# Patient Record
Sex: FEMALE
[De-identification: names, ages, dates, MRNs, and addresses within clinical notes are randomized per-mention and may not be internally consistent; named-entity substitution may affect disease eponyms.]

---

## 2018-01-24 ENCOUNTER — HOSPITAL ENCOUNTER (EMERGENCY)
Dept: HOSPITAL 5 - ED | Age: 33
LOS: 1 days | Discharge: HOME | End: 2018-01-25
Payer: SELF-PAY

## 2018-01-24 DIAGNOSIS — Z98.51: ICD-10-CM

## 2018-01-24 DIAGNOSIS — J11.1: Primary | ICD-10-CM

## 2018-01-24 PROCEDURE — 71046 X-RAY EXAM CHEST 2 VIEWS: CPT

## 2018-01-24 PROCEDURE — 87400 INFLUENZA A/B EACH AG IA: CPT

## 2018-01-24 NOTE — XRAY REPORT
FINAL REPORT



EXAM:  XR CHEST ROUTINE 2V



HISTORY:  cough 



TECHNIQUE:  Two view chest PA and lateral 



PRIORS:  None.



FINDINGS:  

Cardiac and mediastinal contours are unremarkable.  No focal

pulmonary infiltrate is identified.  No pleural fluid collection

seen. Pulmonary vasculature is unremarkable. 



IMPRESSION:  

Negative two-view chest

## 2018-01-25 NOTE — EMERGENCY DEPARTMENT REPORT
- General


Chief Complaint: Upper Respiratory Infection


Stated Complaint: FLU


Time Seen by Provider: 18 22:30


Source: patient


Mode of arrival: Ambulatory


Limitations: No Limitations





- History of Present Illness


Initial Comments: 





This is a 32-year-old female nontoxic, well nourished in appearance, no acute 

signs of distress presents to the ED with c/o of nonproductive cough, body aches

,  rhinorrhea, sore throat, and nasal congestion 1 day.  Patient stated her 

coworkers who she is in close contact with is diagnosed with flu.  Patient 

denies any recent travels, long car rides, recent hospital stays.  Patient 

denies any calf pain or calf tenderness.  Denies any hemoptysis.  Patient 

denies chest pain, shortness of breath, fever, chills, nausea, vomiting, 

headache, stiff neck, numbness, tingling.  Patient denies any allergies or PMH. 


MD Complaint: cough, sore throat, rhinorrhea, nasal congestion


-: days(s) (1)


Severity: mild


Severity scale (0 -10): 8


Quality: aching


Consistency: constant


Improves With: nothing


Worsens With: nothing


Associated Symptoms: rhinorrhea, nasal congestion, sore throat, cough.  denies: 

fever, chills, myalgias, diaphoresis, headache, stiff neck, chest pain, 

shortness of breath, abdominal pain, nausea, vomiting, diarrhea, dysuria, rash, 

confusion, right sweats, weight loss, epistaxis, hoarseness, ear pain


Treatments Prior to Arrival: none





- Related Data


 Previous Rx's











 Medication  Instructions  Recorded  Last Taken  Type


 


Benzonatate [Tessalon Perle] 100 mg PO Q6H PRN #20 capsule 18 Unknown Rx


 


Oseltamivir [Tamiflu] 75 mg PO BID #14 cap 18 Unknown Rx











 Allergies











Allergy/AdvReac Type Severity Reaction Status Date / Time


 


No Known Allergies Allergy   Unverified 18 18:42














ED Review of Systems


ROS: 


Stated complaint: FLU


Other details as noted in HPI





Constitutional: denies: chills, fever


Eyes: denies: eye pain, eye discharge, vision change


ENT: throat pain.  denies: ear pain


Respiratory: cough.  denies: shortness of breath, wheezing


Cardiovascular: denies: chest pain, palpitations


Endocrine: no symptoms reported


Gastrointestinal: denies: abdominal pain, nausea, diarrhea


Genitourinary: denies: urgency, dysuria, discharge


Musculoskeletal: denies: back pain, joint swelling, arthralgia


Skin: denies: rash, lesions


Neurological: denies: headache, weakness, paresthesias


Psychiatric: denies: anxiety, depression


Hematological/Lymphatic: denies: easy bleeding, easy bruising





ED Past Medical Hx





- Past Medical History


Previous Medical History?: Yes


Additional medical history: Flu likje s/s





- Surgical History


Past Surgical History?: Yes


Additional Surgical History:  x 3, Tubaligation, [place]





- Social History


Smoking Status: Never Smoker


Substance Use Type: Non Opiate Pain, Other





- Medications


Home Medications: 


 Home Medications











 Medication  Instructions  Recorded  Confirmed  Last Taken  Type


 


Benzonatate [Tessalon Perle] 100 mg PO Q6H PRN #20 capsule 18  Unknown Rx


 


Oseltamivir [Tamiflu] 75 mg PO BID #14 cap 18  Unknown Rx














ED Physical Exam





- General


Limitations: No Limitations


General appearance: alert, in no apparent distress





- Head


Head exam: Present: atraumatic, normocephalic





- Eye


Eye exam: Present: normal appearance, PERRL, EOMI


Pupils: Present: normal accommodation





- ENT


ENT exam: Present: mucous membranes moist, TM's normal bilaterally, normal 

external ear exam





- Expanded ENT Exam


  ** Expanded


Ear exam: Present: normal external inspection


Mouth exam: Present: normal external inspection, tongue normal.  Absent: 

drooling, trismus, muffled voice, tongue elevation, laceration


Teeth exam: Present: normal inspection


Throat exam: Positive: tonsillar erythema, other (Uvula midline. No abscess or 

swelling noted.).  Negative: tonsillomegaly, tonsillar exudate, R peritonsillar 

mass, L peritonsillar mass





- Neck


Neck exam: Present: normal inspection, full ROM.  Absent: tenderness, 

meningismus, lymphadenopathy, thyromegaly





- Respiratory


Respiratory exam: Present: normal lung sounds bilaterally.  Absent: respiratory 

distress, wheezes, rales, rhonchi, stridor, chest wall tenderness, accessory 

muscle use, decreased breath sounds, prolonged expiratory





- Cardiovascular


Cardiovascular Exam: Present: regular rate, normal rhythm, normal heart sounds.

  Absent: bradycardia, tachycardia, irregular rhythm, systolic murmur, 

diastolic murmur, rubs, gallop





- GI/Abdominal


GI/Abdominal exam: Present: soft, normal bowel sounds.  Absent: distended, 

tenderness, guarding, rebound, rigid, diminished bowel sounds





- Rectal


Rectal exam: Present: deferred





- Extremities Exam


Extremities exam: Present: normal inspection, full ROM, normal capillary 

refill.  Absent: tenderness, pedal edema, joint swelling, calf tenderness





- Back Exam


Back exam: Present: normal inspection, full ROM.  Absent: tenderness, CVA 

tenderness (R), CVA tenderness (L), muscle spasm, paraspinal tenderness, 

vertebral tenderness, rash noted





- Neurological Exam


Neurological exam: Present: alert, oriented X3, CN II-XII intact, normal gait, 

reflexes normal





- Psychiatric


Psychiatric exam: Present: normal affect, normal mood





- Skin


Skin exam: Present: warm, dry, intact, normal color.  Absent: rash





ED Course





 Vital Signs











  18





  18:39


 


Temperature 97.8 F


 


Pulse Rate 75


 


Respiratory 18





Rate 


 


Blood Pressure 156/98


 


O2 Sat by Pulse 100





Oximetry 














- Reevaluation(s)


Reevaluation #1: 





18 00:23


Patient is speaking in full sentences with no signs of distress noted.





ED Medical Decision Making





- Medical Decision Making





This is a 32-year-old female that presents with possible influenza.  Patient is 

stable and was examined by me.  Chest x-ray has been obtained and dictated by 

radiologist within normal limits.  Patient notified of x-ray results with no 

plates noted by the patient.  Influenza swab negative.  Patient received Motrin 

and Tessalon Perles in the ED.  Vital signs stable prior to discharge.  Patient 

is afebrile.  Normal heart rate.  I'll treat patient empirically with tamiflu 

at discharge due to patient having flu like symptoms and being in cotnact with 

positive flu.  Patient was orally rehydrated in the ER and patient tolerated 

well with no signs of nasuea or vomiting. Patient was instructed Follow-up with 

a primary care doctor in 3-5 days or if symptoms worsen and continue return to 

emergency room as soon as possible.  At time time of discharge, the patient 

does not seem toxic or ill in appearance.  No acute signs of distress noted.  

Patient agrees to discharge treatment plan of care.  No further questions noted 

by the patient.


Critical care attestation.: 


If time is entered above; I have spent that time in minutes in the direct care 

of this critically ill patient, excluding procedure time.








ED Disposition


Clinical Impression: 


 Influenza





Disposition: DC- TO HOME OR SELFCARE


Is pt being admited?: No


Does the pt Need Aspirin: No


Condition: Stable


Instructions:  Influenza (ED), Oseltamivir (By mouth)


Additional Instructions: 


Follow-up with a primary care doctor in 3-5 days or if symptoms worsen and 

continue return to emergency room as soon as possible. 


Increase rest, hydration, and take Motrin/Tylenol over-the-counter if fever 

presents.


Prescriptions: 


Benzonatate [Tessalon Perle] 100 mg PO Q6H PRN #20 capsule


 PRN Reason: Cough


Oseltamivir [Tamiflu] 75 mg PO BID #14 cap


Referrals: 


JOHN PANCHAL MD [Primary Care Provider] - 3-5 Days


PRIMARY CARE,MD [Referring] - 3-5 Days


[facility] [Outside] - 3-5 Days


[facility] [Outside] - 3-5 Days


Forms:  Work/School Release Form(ED)

## 2024-08-31 ENCOUNTER — APPOINTMENT (OUTPATIENT)
Dept: RADIOLOGY | Facility: MEDICAL CENTER | Age: 39
End: 2024-08-31
Attending: STUDENT IN AN ORGANIZED HEALTH CARE EDUCATION/TRAINING PROGRAM

## 2024-08-31 ENCOUNTER — HOSPITAL ENCOUNTER (EMERGENCY)
Facility: MEDICAL CENTER | Age: 39
End: 2024-08-31
Attending: STUDENT IN AN ORGANIZED HEALTH CARE EDUCATION/TRAINING PROGRAM

## 2024-08-31 VITALS
HEART RATE: 89 BPM | DIASTOLIC BLOOD PRESSURE: 105 MMHG | RESPIRATION RATE: 16 BRPM | WEIGHT: 293 LBS | BODY MASS INDEX: 50.02 KG/M2 | TEMPERATURE: 97 F | SYSTOLIC BLOOD PRESSURE: 161 MMHG | HEIGHT: 64 IN | OXYGEN SATURATION: 96 %

## 2024-08-31 DIAGNOSIS — S80.11XA CONTUSION OF MULTIPLE SITES OF RIGHT LOWER EXTREMITY, INITIAL ENCOUNTER: ICD-10-CM

## 2024-08-31 DIAGNOSIS — S80.12XA CONTUSION OF MULTIPLE SITES OF LEFT LOWER EXTREMITY, INITIAL ENCOUNTER: ICD-10-CM
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
## 2024-08-31 PROCEDURE — 73590 X-RAY EXAM OF LOWER LEG: CPT | Mod: RT

## 2024-08-31 PROCEDURE — 73090 X-RAY EXAM OF FOREARM: CPT | Mod: RT

## 2024-08-31 PROCEDURE — 73590 X-RAY EXAM OF LOWER LEG: CPT | Mod: LT

## 2024-08-31 PROCEDURE — 73552 X-RAY EXAM OF FEMUR 2/>: CPT | Mod: LT

## 2024-08-31 PROCEDURE — 99284 EMERGENCY DEPT VISIT MOD MDM: CPT

## 2024-08-31 PROCEDURE — 73552 X-RAY EXAM OF FEMUR 2/>: CPT | Mod: RT
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
## 2025-01-02 ENCOUNTER — NON-PROVIDER VISIT (OUTPATIENT)
Dept: OCCUPATIONAL MEDICINE | Facility: CLINIC | Age: 40
End: 2025-01-02

## 2025-01-02 DIAGNOSIS — Z02.1 PRE-EMPLOYMENT DRUG SCREENING: ICD-10-CM

## 2025-01-02 LAB
AMP AMPHETAMINE: NORMAL
COC COCAINE: NORMAL
INT CON NEG: NEGATIVE
INT CON POS: POSITIVE
MET METHAMPHETAMINES: NORMAL
OPI OPIATES: NORMAL
PCP PHENCYCLIDINE: NORMAL
POC DRUG COMMENT 753798-OCCUPATIONAL HEALTH: NEGATIVE
THC: NORMAL

## 2025-01-02 PROCEDURE — 80305 DRUG TEST PRSMV DIR OPT OBS: CPT | Performed by: PREVENTIVE MEDICINE

## 2025-04-16 ENCOUNTER — HOSPITAL ENCOUNTER (EMERGENCY)
Facility: MEDICAL CENTER | Age: 40
End: 2025-04-16
Attending: EMERGENCY MEDICINE

## 2025-04-16 ENCOUNTER — PHARMACY VISIT (OUTPATIENT)
Dept: PHARMACY | Facility: MEDICAL CENTER | Age: 40
End: 2025-04-16
Payer: COMMERCIAL

## 2025-04-16 ENCOUNTER — APPOINTMENT (OUTPATIENT)
Dept: RADIOLOGY | Facility: MEDICAL CENTER | Age: 40
End: 2025-04-16
Attending: EMERGENCY MEDICINE

## 2025-04-16 VITALS
HEIGHT: 64 IN | DIASTOLIC BLOOD PRESSURE: 81 MMHG | RESPIRATION RATE: 14 BRPM | TEMPERATURE: 97 F | SYSTOLIC BLOOD PRESSURE: 130 MMHG | BODY MASS INDEX: 50.02 KG/M2 | OXYGEN SATURATION: 91 % | WEIGHT: 293 LBS | HEART RATE: 76 BPM

## 2025-04-16 DIAGNOSIS — R05.1 ACUTE COUGH: ICD-10-CM

## 2025-04-16 DIAGNOSIS — H66.001 NON-RECURRENT ACUTE SUPPURATIVE OTITIS MEDIA OF RIGHT EAR WITHOUT SPONTANEOUS RUPTURE OF TYMPANIC MEMBRANE: ICD-10-CM

## 2025-04-16 DIAGNOSIS — J06.9 UPPER RESPIRATORY TRACT INFECTION, UNSPECIFIED TYPE: ICD-10-CM

## 2025-04-16 LAB
ALBUMIN SERPL BCP-MCNC: 3.7 G/DL (ref 3.2–4.9)
ALBUMIN/GLOB SERPL: 1.1 G/DL
ALP SERPL-CCNC: 83 U/L (ref 30–99)
ALT SERPL-CCNC: 26 U/L (ref 2–50)
ANION GAP SERPL CALC-SCNC: 10 MMOL/L (ref 7–16)
AST SERPL-CCNC: 27 U/L (ref 12–45)
BASOPHILS # BLD AUTO: 0.9 % (ref 0–1.8)
BASOPHILS # BLD: 0.08 K/UL (ref 0–0.12)
BILIRUB SERPL-MCNC: 0.4 MG/DL (ref 0.1–1.5)
BUN SERPL-MCNC: 10 MG/DL (ref 8–22)
CALCIUM ALBUM COR SERPL-MCNC: 8.8 MG/DL (ref 8.5–10.5)
CALCIUM SERPL-MCNC: 8.6 MG/DL (ref 8.5–10.5)
CHLORIDE SERPL-SCNC: 106 MMOL/L (ref 96–112)
CO2 SERPL-SCNC: 22 MMOL/L (ref 20–33)
CREAT SERPL-MCNC: 0.72 MG/DL (ref 0.5–1.4)
EKG IMPRESSION: NORMAL
EOSINOPHIL # BLD AUTO: 0.18 K/UL (ref 0–0.51)
EOSINOPHIL NFR BLD: 2 % (ref 0–6.9)
ERYTHROCYTE [DISTWIDTH] IN BLOOD BY AUTOMATED COUNT: 40 FL (ref 35.9–50)
FLUAV RNA SPEC QL NAA+PROBE: NEGATIVE
FLUBV RNA SPEC QL NAA+PROBE: NEGATIVE
GFR SERPLBLD CREATININE-BSD FMLA CKD-EPI: 110 ML/MIN/1.73 M 2
GLOBULIN SER CALC-MCNC: 3.3 G/DL (ref 1.9–3.5)
GLUCOSE SERPL-MCNC: 100 MG/DL (ref 65–99)
HCT VFR BLD AUTO: 43.8 % (ref 37–47)
HGB BLD-MCNC: 14.5 G/DL (ref 12–16)
IMM GRANULOCYTES # BLD AUTO: 0.04 K/UL (ref 0–0.11)
IMM GRANULOCYTES NFR BLD AUTO: 0.4 % (ref 0–0.9)
LYMPHOCYTES # BLD AUTO: 1.53 K/UL (ref 1–4.8)
LYMPHOCYTES NFR BLD: 16.8 % (ref 22–41)
MCH RBC QN AUTO: 30.1 PG (ref 27–33)
MCHC RBC AUTO-ENTMCNC: 33.1 G/DL (ref 32.2–35.5)
MCV RBC AUTO: 91.1 FL (ref 81.4–97.8)
MONOCYTES # BLD AUTO: 0.53 K/UL (ref 0–0.85)
MONOCYTES NFR BLD AUTO: 5.8 % (ref 0–13.4)
NEUTROPHILS # BLD AUTO: 6.74 K/UL (ref 1.82–7.42)
NEUTROPHILS NFR BLD: 74.1 % (ref 44–72)
NRBC # BLD AUTO: 0.02 K/UL
NRBC BLD-RTO: 0.2 /100 WBC (ref 0–0.2)
NT-PROBNP SERPL IA-MCNC: 54 PG/ML (ref 0–125)
PLATELET # BLD AUTO: 311 K/UL (ref 164–446)
PMV BLD AUTO: 10.2 FL (ref 9–12.9)
POTASSIUM SERPL-SCNC: 4 MMOL/L (ref 3.6–5.5)
PROT SERPL-MCNC: 7 G/DL (ref 6–8.2)
RBC # BLD AUTO: 4.81 M/UL (ref 4.2–5.4)
RSV RNA SPEC QL NAA+PROBE: NEGATIVE
SARS-COV-2 RNA RESP QL NAA+PROBE: NOTDETECTED
SODIUM SERPL-SCNC: 138 MMOL/L (ref 135–145)
TROPONIN T SERPL-MCNC: <6 NG/L (ref 6–19)
WBC # BLD AUTO: 9.1 K/UL (ref 4.8–10.8)

## 2025-04-16 PROCEDURE — 94640 AIRWAY INHALATION TREATMENT: CPT

## 2025-04-16 PROCEDURE — RXMED WILLOW AMBULATORY MEDICATION CHARGE: Performed by: EMERGENCY MEDICINE

## 2025-04-16 PROCEDURE — 93005 ELECTROCARDIOGRAM TRACING: CPT | Mod: TC | Performed by: EMERGENCY MEDICINE

## 2025-04-16 PROCEDURE — 700102 HCHG RX REV CODE 250 W/ 637 OVERRIDE(OP): Performed by: EMERGENCY MEDICINE

## 2025-04-16 PROCEDURE — 36415 COLL VENOUS BLD VENIPUNCTURE: CPT

## 2025-04-16 PROCEDURE — 85025 COMPLETE CBC W/AUTO DIFF WBC: CPT

## 2025-04-16 PROCEDURE — 84484 ASSAY OF TROPONIN QUANT: CPT

## 2025-04-16 PROCEDURE — 80053 COMPREHEN METABOLIC PANEL: CPT

## 2025-04-16 PROCEDURE — 71045 X-RAY EXAM CHEST 1 VIEW: CPT

## 2025-04-16 PROCEDURE — 83880 ASSAY OF NATRIURETIC PEPTIDE: CPT

## 2025-04-16 PROCEDURE — A9270 NON-COVERED ITEM OR SERVICE: HCPCS | Performed by: EMERGENCY MEDICINE

## 2025-04-16 PROCEDURE — 0241U HCHG SARS-COV-2 COVID-19 NFCT DS RESP RNA 4 TRGT ED POC: CPT

## 2025-04-16 PROCEDURE — 700101 HCHG RX REV CODE 250: Performed by: EMERGENCY MEDICINE

## 2025-04-16 PROCEDURE — 93005 ELECTROCARDIOGRAM TRACING: CPT | Mod: TC

## 2025-04-16 PROCEDURE — 99284 EMERGENCY DEPT VISIT MOD MDM: CPT

## 2025-04-16 RX ORDER — BENZONATATE 200 MG/1
200 CAPSULE ORAL 3 TIMES DAILY PRN
Qty: 30 CAPSULE | Refills: 0 | Status: SHIPPED | OUTPATIENT
Start: 2025-04-16 | End: 2025-04-26

## 2025-04-16 RX ORDER — IPRATROPIUM BROMIDE AND ALBUTEROL SULFATE 2.5; .5 MG/3ML; MG/3ML
3 SOLUTION RESPIRATORY (INHALATION) ONCE
Status: COMPLETED | OUTPATIENT
Start: 2025-04-16 | End: 2025-04-16

## 2025-04-16 RX ORDER — AMOXICILLIN 500 MG/1
500 CAPSULE ORAL ONCE
Status: COMPLETED | OUTPATIENT
Start: 2025-04-16 | End: 2025-04-16

## 2025-04-16 RX ORDER — ALBUTEROL SULFATE 90 UG/1
2 INHALANT RESPIRATORY (INHALATION) EVERY 6 HOURS PRN
Qty: 8.5 G | Refills: 0 | Status: SHIPPED | OUTPATIENT
Start: 2025-04-16

## 2025-04-16 RX ORDER — AMOXICILLIN 500 MG/1
1000 CAPSULE ORAL DAILY
Qty: 20 CAPSULE | Refills: 0 | Status: ACTIVE | OUTPATIENT
Start: 2025-04-16 | End: 2025-04-26

## 2025-04-16 RX ORDER — IBUPROFEN 600 MG/1
600 TABLET, FILM COATED ORAL ONCE
Status: COMPLETED | OUTPATIENT
Start: 2025-04-16 | End: 2025-04-16

## 2025-04-16 RX ORDER — BENZONATATE 100 MG/1
200 CAPSULE ORAL ONCE
Status: COMPLETED | OUTPATIENT
Start: 2025-04-16 | End: 2025-04-16

## 2025-04-16 RX ADMIN — IBUPROFEN 600 MG: 600 TABLET, FILM COATED ORAL at 09:06

## 2025-04-16 RX ADMIN — BENZONATATE 200 MG: 100 CAPSULE ORAL at 09:06

## 2025-04-16 RX ADMIN — AMOXICILLIN 500 MG: 500 CAPSULE ORAL at 09:06

## 2025-04-16 RX ADMIN — IPRATROPIUM BROMIDE AND ALBUTEROL SULFATE 3 ML: .5; 2.5 SOLUTION RESPIRATORY (INHALATION) at 08:45

## 2025-04-16 ASSESSMENT — COPD QUESTIONNAIRES
DO YOU EVER COUGH UP ANY MUCUS OR PHLEGM?: NO/ONLY WITH OCCASIONAL COLDS OR INFECTIONS
COPD SCREENING SCORE: 0
DURING THE PAST 4 WEEKS HOW MUCH DID YOU FEEL SHORT OF BREATH: NONE/LITTLE OF THE TIME
HAVE YOU SMOKED AT LEAST 100 CIGARETTES IN YOUR ENTIRE LIFE: NO/DON'T KNOW

## 2025-04-16 NOTE — ED NOTES
Discharge instructions reviewed with patient. Patient verbalizes understanding of follow up care, medication management, and reasons to return to ER.

## 2025-04-16 NOTE — ED NOTES
Pt ambulates to room with steady gait. Complaint consistent with triage note. + cough, green sputum production & dry. Dressed in gown. Call light within reach.

## 2025-04-16 NOTE — ED PROVIDER NOTES
ED Provider Note    CHIEF COMPLAINT  Chief Complaint   Patient presents with    Shortness of Breath     Day 5    Flu Like Symptoms     R ear drainage. Sore throat. Coughing to where pt vomits and states she has noticed slight blood in her emesis.      EXTERNAL RECORDS REVIEWED  Non-contributory    HPI/ROS  LIMITATION TO HISTORY   Select: : None  OUTSIDE HISTORIAN(S):  none    Raquel Nelson is a 39 y.o. female who presents with 5 days of sinus pain and pressure with worsening shortness of breath in the last 3 days. Pt reports this has progressed to difficulty sleeping at night and is causing her to miss work. She reports her cough as mostly non-productive, with occasional green/brown sputum production. She also reports some blood in sputum with forceful coughing. She reports being in contact with a sick contact on Friday.  No active symptomology with walking based on sitting.  There is some pressure congestion with worsening symptoms on the right side of her ear with no discharge.  She has not had any vomiting or nausea, no abdominal distention no urinary symptoms.  No prior cardiac history and no prior history of PE or DVT.    PAST MEDICAL HISTORY   Pt denies medical hx    SURGICAL HISTORY  patient denies any surgical history    FAMILY HISTORY  History reviewed. No pertinent family history.    SOCIAL HISTORY  Social History     Tobacco Use    Smoking status: Never    Smokeless tobacco: Never   Substance and Sexual Activity    Alcohol use: Not Currently    Drug use: Never    Sexual activity: Not on file     CURRENT MEDICATIONS  Home Medications       Reviewed by Kathryn Ramos R.N. (Registered Nurse) on 04/16/25 at 0734  Med List Status: Partial     Medication Last Dose Status        Patient [place] Taking any Medications                         ALLERGIES  Allergies   Allergen Reactions    Macrobid [Nitrofurantoin] Hives     PHYSICAL EXAM  VITAL SIGNS: /81   Pulse 76   Temp 36.1 °C (97 °F) (Temporal)  "  Resp 14   Ht 1.626 m (5' 4\")   Wt (!) 190 kg (419 lb 1.5 oz)   SpO2 91%   BMI 71.94 kg/m²    Genl: F sitting in chair comfortably, speaking clearly, appears sick but in no acute distress   Head: NC/AT   ENT: Mucous membranes moist, posterior pharynx erythematous, edematous tonsils, uvula midline, erythema and edema of nares noted.   Eyes: Normal sclera, pupils equal round reactive to light  Neck: Supple, FROM, no LAD appreciated   Pulmonary: Transmitted upper respiratory sounds bilaterally, mild wheezing bilaterally.   Chest: No TTP  CV:  RRR, no murmur appreciated, pulses 2+ in both upper and lower extremities,  Abdomen: soft, NT/ND; no rebound/guarding, no masses palpated, no HSM   : no CVA or suprapubic tenderness   Musculoskeletal: Pain free ROM of the neck. Moving upper and lower extremities in spontaneous and coordinated fashion  Neuro: A&Ox4 (person, place, time, situation), speech fluent, gait steady, no focal deficits appreciated  Skin: No rash or lesions.  No pallor or jaundice.  No cyanosis.  Warm and dry.     EKG/LABS  Labs Reviewed   CBC WITH DIFFERENTIAL - Abnormal; Notable for the following components:       Result Value    Neutrophils-Polys 74.10 (*)     Lymphocytes 16.80 (*)     All other components within normal limits   COMP METABOLIC PANEL - Abnormal; Notable for the following components:    Glucose 100 (*)     All other components within normal limits   PROBRAIN NATRIURETIC PEPTIDE, NT   TROPONIN   ESTIMATED GFR   POCT COV-2, FLU A/B, RSV BY PCR   POC COV-2, FLU A/B, RSV BY PCR     RADIOLOGY/PROCEDURES   I have independently interpreted the diagnostic imaging associated with this visit and am waiting the final reading from the radiologist.   My preliminary interpretation is as follows: No focal infiltrates    Radiologist interpretation:  DX-CHEST-PORTABLE (1 VIEW)   Final Result      Mild bibasilar atelectasis.        COURSE & MEDICAL DECISION MAKING    ASSESSMENT, COURSE AND PLAN  Care " Narrative: Seen and evaluated for symptoms as described above.  The patient describes having upper respiratory symptomology, congestion and following evaluation shows some tonsillar erythema without significant evidence of tonsillitis and in the absence of fever is not likely to be viral.  There is likely some eustachian tube dysfunction and I can see some pressure on the left but evidence of progression to an infection on the right.  Chest x-ray and DuoNeb therapy was given for the patient's respiratory symptoms.  She does not have hypoxia nor does she have significant respiratory distress or tachypnea and has no history of PE and I would doubt that an this scenario she has concurrent URI-like symptoms with pulmonary emboli.  There is no evidence of focal consolidations on x-ray, she feels improved after DuoNeb therapies and Tessalon.  With coughing she has mobilization of secretions and there is no hemoptysis noted here.  We have talked about the possibility of blood streaking in her sputum likely from either tonsillar irritation or the possibility of concurrent bronchitis from the viral irritation.  Again the patient is reevaluated, was walked with nursing staff and was feeling improved.  At this time the patient will be treated with amoxicillin for otitis media, 10 days duration, symptomatic medications including HFA inhaler, Tessalon Perles she will follow-up with her primary care doctor.    Discharged home in stable condition.    DISPOSITION AND DISCUSSIONS  I have discussed management of the patient with the following physicians and ELAYNE's:  none    Discussion of management with other QHP or appropriate source(s): None     Escalation of care considered, and ultimately not performed:acute inpatient care management, however at this time, the patient is most appropriate for outpatient management    Barriers to care at this time, including but not limited to: none.     Decision tools and prescription drugs  considered including, but not limited to: Antibiotics amox - tessalon/hfa inhaler .    FINAL DIAGNOSIS  1. Acute cough    2. Upper respiratory tract infection, unspecified type    3. Non-recurrent acute suppurative otitis media of right ear without spontaneous rupture of tympanic membrane         Electronically signed by: Johnny Davis M.D., 4/16/2025 8:00 AM

## 2025-04-16 NOTE — ED TRIAGE NOTES
"Chief Complaint   Patient presents with    Shortness of Breath     Day 5    Flu Like Symptoms     R ear drainage. Sore throat. Coughing to where pt vomits and states she has noticed slight blood in her emesis.        40 yo female to triage for above complaint. Ambulatory. SOB protocol ordered. Covid swab obtained in triage.     Pt is alert and oriented, speaking in full sentences, follows commands and responds appropriately to questions.     Patient placed back in lobby and educated on triage process. Asked to inform RN of any changes.    BP (!) 180/110   Pulse (!) 101   Temp 35.8 °C (96.5 °F) (Temporal)   Resp 18   Ht 1.626 m (5' 4\")   Wt (!) 190 kg (419 lb 1.5 oz)   SpO2 94%   BMI 71.94 kg/m²     "

## 2025-04-16 NOTE — Clinical Note
Raquel Nelson was seen and treated in our emergency department on 4/16/2025.  She may return to work on 04/18/2025.       If you have any questions or concerns, please don't hesitate to call.      Johnny Davis M.D.

## 2025-06-11 ENCOUNTER — NON-PROVIDER VISIT (OUTPATIENT)
Dept: OCCUPATIONAL MEDICINE | Facility: CLINIC | Age: 40
End: 2025-06-11

## 2025-06-11 DIAGNOSIS — Z02.89 VISIT FOR OCCUPATIONAL HEALTH EXAMINATION: Primary | ICD-10-CM

## 2025-06-11 LAB
AMP AMPHETAMINE: NORMAL
COC COCAINE: NORMAL
INT CON NEG: NORMAL
INT CON POS: NORMAL
MET METHAMPHETAMINES: NORMAL
OPI OPIATES: NORMAL
PCP PHENCYCLIDINE: NORMAL
POC DRUG COMMENT 753798-OCCUPATIONAL HEALTH: NORMAL
THC: NORMAL

## 2025-06-11 PROCEDURE — 80305 DRUG TEST PRSMV DIR OPT OBS: CPT | Performed by: PREVENTIVE MEDICINE
